# Patient Record
Sex: MALE | Race: WHITE | NOT HISPANIC OR LATINO | Employment: OTHER | URBAN - METROPOLITAN AREA
[De-identification: names, ages, dates, MRNs, and addresses within clinical notes are randomized per-mention and may not be internally consistent; named-entity substitution may affect disease eponyms.]

---

## 2017-02-12 ENCOUNTER — PATIENT MESSAGE (OUTPATIENT)
Dept: MEDICAL GROUP | Facility: PHYSICIAN GROUP | Age: 62
End: 2017-02-12

## 2017-02-13 DIAGNOSIS — E78.5 DYSLIPIDEMIA: ICD-10-CM

## 2017-03-13 ENCOUNTER — OFFICE VISIT (OUTPATIENT)
Dept: MEDICAL GROUP | Facility: PHYSICIAN GROUP | Age: 62
End: 2017-03-13
Payer: COMMERCIAL

## 2017-03-13 VITALS
TEMPERATURE: 97.8 F | WEIGHT: 197 LBS | SYSTOLIC BLOOD PRESSURE: 130 MMHG | DIASTOLIC BLOOD PRESSURE: 84 MMHG | BODY MASS INDEX: 30.92 KG/M2 | OXYGEN SATURATION: 96 % | HEIGHT: 67 IN | HEART RATE: 66 BPM | RESPIRATION RATE: 16 BRPM

## 2017-03-13 DIAGNOSIS — E78.5 DYSLIPIDEMIA: ICD-10-CM

## 2017-03-13 DIAGNOSIS — Z12.5 PROSTATE CANCER SCREENING: ICD-10-CM

## 2017-03-13 DIAGNOSIS — E66.9 OBESITY (BMI 30-39.9): ICD-10-CM

## 2017-03-13 DIAGNOSIS — E55.9 VITAMIN D DEFICIENCY: ICD-10-CM

## 2017-03-13 DIAGNOSIS — Z13.1 SCREENING FOR DIABETES MELLITUS (DM): ICD-10-CM

## 2017-03-13 PROCEDURE — 99214 OFFICE O/P EST MOD 30 MIN: CPT | Performed by: INTERNAL MEDICINE

## 2017-03-13 RX ORDER — PRAVASTATIN SODIUM 40 MG
40 TABLET ORAL DAILY
Qty: 90 TAB | Refills: 3 | Status: SHIPPED | OUTPATIENT
Start: 2017-03-13 | End: 2018-04-03 | Stop reason: SDUPTHER

## 2017-03-13 ASSESSMENT — PATIENT HEALTH QUESTIONNAIRE - PHQ9: CLINICAL INTERPRETATION OF PHQ2 SCORE: 0

## 2017-03-13 NOTE — ASSESSMENT & PLAN NOTE
Pt is on atorvastatin 20 mg daily. Patient is taking medication as prescribed    Patient does have myalgia since starting this medication in November.

## 2017-03-13 NOTE — PROGRESS NOTES
"Subjective:   Jd Bermudez is a 61 y.o. male here today for DLD, vitamin D deficiency    Dyslipidemia  Pt is on atorvastatin 20 mg daily. Patient is taking medication as prescribed    Patient does have myalgia since starting this medication in November.     Vitamin D deficiency  Pt is on vitamin D supplementation of 5000 IU per day. Most recent labs show normal Vit D       Current medicines (including changes today)  Current Outpatient Prescriptions   Medication Sig Dispense Refill   • pravastatin (PRAVACHOL) 40 MG tablet Take 1 Tab by mouth every day. 90 Tab 3   • vitamin D (CHOLECALCIFEROL) 1000 UNIT TABS Take 1,000 Units by mouth every day.       No current facility-administered medications for this visit.     He  has a past medical history of Chronic back pain; Vitamin D deficiency; and Hyperlipidemia.    ROS   + chest pain - atypical and has been present for many years (15 + years). Pt reports previous stress test that he was told was normal  Pertinent  ROS findings as above. All other systems reviewed and are negative.      Objective:     Blood pressure 130/84, pulse 66, temperature 36.6 °C (97.8 °F), resp. rate 16, height 1.702 m (5' 7\"), weight 89.359 kg (197 lb), SpO2 96 %. Body mass index is 30.85 kg/(m^2).   Physical Exam:  Constitutional: Alert & oriented, no acute distress  Eye: Conjunctiva clear, lids normal, no discharge  ENMT: Lips without lesions, normal external nose and ears  Neck: Trachea midline, no masses, no thyromegaly. No cervical or supraclavicular lymphadenopathy  Respiratory: Unlabored respiratory effort, lungs clear to auscultation, no wheezes, no ronchi  Cardiovascular: Normal S1, S2, no murmur, no edema  Abdomen: obese  Skin: Warm, dry, good turgor, no rashes in visible areas  Neuro: No overt focal neurologic deficits, normal gait  Psych: Normal mood and affect      Assessment and Plan:   The following treatment plan was discussed    1. Dyslipidemia  Given myalgia will change to " pravastatin. Recheck labs in 3 months to assess response and assess liver enzymes  - pravastatin (PRAVACHOL) 40 MG tablet; Take 1 Tab by mouth every day.  Dispense: 90 Tab; Refill: 3  - CBC WITH DIFFERENTIAL; Future  - LIPID PROFILE; Future    2. Vitamin D deficiency  Well controlled. Continue supplementation of 5000 IU per day  - VITAMIN D,25 HYDROXY; Future    3. Screening for diabetes mellitus (DM)  - COMP METABOLIC PANEL; Future    4. Prostate cancer screening  - PROSTATE SPECIFIC AG SCREENING; Future    5. Obesity (BMI 30-39.9)  - Patient identified as having weight management issue.  Appropriate orders and counseling given.    6. Chest Pain  - atypical and unchanged and has been present for many years (15 + years). Pt reports previous stress test that he was told was normal. Monitor clinically with low threshold for repeat stress test if new symptoms arise    Followup: Return in about 6 months (around 9/13/2017).    Please note that this dictation was created using voice recognition software. I have made every reasonable attempt to correct obvious errors, but I expect that there are errors of grammar and possibly content that I did not discover before finalizing the note.

## 2017-07-26 ENCOUNTER — TELEPHONE (OUTPATIENT)
Dept: MEDICAL GROUP | Facility: PHYSICIAN GROUP | Age: 62
End: 2017-07-26

## 2017-07-26 NOTE — TELEPHONE ENCOUNTER
----- Message from Oliver Lange M.D. sent at 7/25/2017 10:15 AM PDT -----  Labs reviewed and I have no concerns. Vitamin D is in the normal range, there are no elevation of liver enzymes, cholesterol levels appear to be controlled on pravastatin. Please call patient and inform them. Thank you  - Dr. Lange

## 2017-09-21 ENCOUNTER — TELEPHONE (OUTPATIENT)
Dept: HEALTH INFORMATION MANAGEMENT | Facility: OTHER | Age: 62
End: 2017-09-21

## 2017-09-21 DIAGNOSIS — Z12.11 SCREENING FOR COLON CANCER: ICD-10-CM

## 2017-09-26 ENCOUNTER — TELEPHONE (OUTPATIENT)
Dept: MEDICAL GROUP | Facility: PHYSICIAN GROUP | Age: 62
End: 2017-09-26

## 2017-09-26 DIAGNOSIS — Z12.11 COLON CANCER SCREENING: ICD-10-CM

## 2017-09-26 NOTE — TELEPHONE ENCOUNTER
----- Message from Jd Bermudez sent at 9/26/2017  4:00 PM PDT -----  Regarding: Non-Urgent Medical Question  Contact: [phone]  Hi, thanks for referral #[id number]. However I am going to [facility] ,they did my last one  in 2007.Dr. Pineda.Is this a problem?

## 2017-09-26 NOTE — TELEPHONE ENCOUNTER
1. Caller Name: Jd Bermudez                                         Call Back Number: [phone] (home)       Patient approves a detailed voicemail message: N\A    2. SPECIFIC Action To Be Taken: referral needed for Colonoscopy Previous referral already closed due to expiring please double check expiration date that it is one year thank you    3. Diagnosis/Clinical Reason for Request: Z12.11 (ICD-10-CM) - Screening for colon cancer*    4. Specialty & Provider Name/Lab/Imaging Location: [facility]    5. Is appointment scheduled for requested order/referral: no    Patient informed they will receive a return phone call from the office ONLY if there are any questions before processing their request. Advised to call back if they haven't received a call from the referral department in 5 days.

## 2017-10-02 ENCOUNTER — APPOINTMENT (OUTPATIENT)
Dept: MEDICAL GROUP | Facility: PHYSICIAN GROUP | Age: 62
End: 2017-10-02
Payer: COMMERCIAL

## 2017-10-10 ENCOUNTER — NON-PROVIDER VISIT (OUTPATIENT)
Dept: MEDICAL GROUP | Facility: PHYSICIAN GROUP | Age: 62
End: 2017-10-10
Payer: COMMERCIAL

## 2017-10-10 DIAGNOSIS — Z23 NEED FOR VACCINATION: ICD-10-CM

## 2017-10-10 PROCEDURE — 90471 IMMUNIZATION ADMIN: CPT | Performed by: INTERNAL MEDICINE

## 2017-10-10 PROCEDURE — 90686 IIV4 VACC NO PRSV 0.5 ML IM: CPT | Performed by: INTERNAL MEDICINE

## 2017-10-10 NOTE — PROGRESS NOTES
"Jd Bermudez is a 62 y.o. male here for a non-provider visit for:   FLU    Reason for immunization: Annual Flu Vaccine  Immunization records indicate need for vaccine: Yes, confirmed with Epic  Minimum interval has been met for this vaccine: Yes  ABN completed: Not Indicated    Order and dose verified by: MICHAEL  VIS Dated  8/7/15 was given to patient: Yes  All IAC Questionnaire questions were answered \"No.\"    Patient tolerated injection and no adverse effects were observed or reported: Yes    Pt scheduled for next dose in series: Not Indicated  "

## 2017-11-08 ENCOUNTER — OFFICE VISIT (OUTPATIENT)
Dept: MEDICAL GROUP | Facility: PHYSICIAN GROUP | Age: 62
End: 2017-11-08
Payer: COMMERCIAL

## 2018-04-03 DIAGNOSIS — E78.5 DYSLIPIDEMIA: ICD-10-CM

## 2018-04-03 RX ORDER — PRAVASTATIN SODIUM 40 MG
40 TABLET ORAL DAILY
Qty: 90 TAB | Refills: 0 | Status: SHIPPED | OUTPATIENT
Start: 2018-04-03 | End: 2018-05-07 | Stop reason: SDUPTHER

## 2018-04-03 NOTE — TELEPHONE ENCOUNTER
Requested Prescriptions     Signed Prescriptions Disp Refills   • pravastatin (PRAVACHOL) 40 MG tablet 90 Tab 0     Sig: Take 1 Tab by mouth every day. Patient needs to establish with a new PCP for future refills 04/03/18     Authorizing Provider: JONATHAN FREY A.P.R.N.

## 2018-05-07 DIAGNOSIS — E78.5 DYSLIPIDEMIA: ICD-10-CM

## 2018-05-07 RX ORDER — PRAVASTATIN SODIUM 40 MG
40 TABLET ORAL DAILY
Qty: 90 TAB | Refills: 1 | Status: SHIPPED | OUTPATIENT
Start: 2018-05-07 | End: 2018-08-09 | Stop reason: SDUPTHER

## 2018-05-07 NOTE — TELEPHONE ENCOUNTER
----- Message from Mora Brady sent at 5/7/2018 10:04 AM PDT -----  Regarding: FW: Provsandeep  Contact: [phone]      ----- Message -----  From: Jd Bermudez  Sent: 5/7/2018   7:10 AM  To: Nilson Hill  Subject: Provastatin                                      Topic: Technical Quality    Could I please get refilled?

## 2018-05-08 NOTE — TELEPHONE ENCOUNTER
Requested Prescriptions     Signed Prescriptions Disp Refills   • pravastatin (PRAVACHOL) 40 MG tablet 90 Tab 1     Sig: Take 1 Tab by mouth every day. Patient needs to establish with a new PCP for future refills 04/03/18     Authorizing Provider: JONATHAN FREY A.P.R.N.

## 2018-08-09 ENCOUNTER — OFFICE VISIT (OUTPATIENT)
Dept: MEDICAL GROUP | Facility: PHYSICIAN GROUP | Age: 63
End: 2018-08-09
Payer: COMMERCIAL

## 2018-08-09 VITALS
RESPIRATION RATE: 14 BRPM | HEART RATE: 69 BPM | SYSTOLIC BLOOD PRESSURE: 128 MMHG | BODY MASS INDEX: 30.31 KG/M2 | DIASTOLIC BLOOD PRESSURE: 78 MMHG | TEMPERATURE: 98.2 F | HEIGHT: 68 IN | OXYGEN SATURATION: 95 % | WEIGHT: 200 LBS

## 2018-08-09 DIAGNOSIS — E55.9 VITAMIN D DEFICIENCY: ICD-10-CM

## 2018-08-09 DIAGNOSIS — E78.5 DYSLIPIDEMIA: ICD-10-CM

## 2018-08-09 DIAGNOSIS — E66.9 OBESITY (BMI 30-39.9): ICD-10-CM

## 2018-08-09 DIAGNOSIS — E66.09 CLASS 1 OBESITY DUE TO EXCESS CALORIES WITHOUT SERIOUS COMORBIDITY WITH BODY MASS INDEX (BMI) OF 30.0 TO 30.9 IN ADULT: ICD-10-CM

## 2018-08-09 DIAGNOSIS — K63.5 BENIGN COLON POLYP: ICD-10-CM

## 2018-08-09 PROBLEM — K51.419 PSEUDOPOLYP OF SIGMOID COLON WITH COMPLICATION (HCC): Status: ACTIVE | Noted: 2018-08-09

## 2018-08-09 PROCEDURE — 99214 OFFICE O/P EST MOD 30 MIN: CPT | Performed by: NURSE PRACTITIONER

## 2018-08-09 RX ORDER — PRAVASTATIN SODIUM 40 MG
TABLET ORAL
Qty: 30 TAB | Refills: 1 | Status: SHIPPED | OUTPATIENT
Start: 2018-08-09 | End: 2018-10-10 | Stop reason: SDUPTHER

## 2018-08-09 RX ORDER — PRAVASTATIN SODIUM 40 MG
TABLET ORAL
Qty: 30 TAB | Refills: 6 | Status: CANCELLED | OUTPATIENT
Start: 2018-08-09

## 2018-08-09 ASSESSMENT — PATIENT HEALTH QUESTIONNAIRE - PHQ9: CLINICAL INTERPRETATION OF PHQ2 SCORE: 0

## 2018-08-09 NOTE — ASSESSMENT & PLAN NOTE
Patient reports that he is not currently taking Vitamin D.  He states he is outside a lot, but is interested in an updated level.  Labs ordered.

## 2018-08-09 NOTE — PROGRESS NOTES
"Chief Complaint   Patient presents with   • Establish Care   • Medication Refill   • Orders Needed     labs    • Hyperlipidemia       Subjective:   Jd Bermudez is a 63 y.o. male here today to establish care and for evaluation and management of:    Benign colon polyp  Had polyps and is on a 3-5 recall.     Vitamin D deficiency  Patient reports that he is not currently taking Vitamin D.  He states he is outside a lot, but is interested in an updated level.  Labs ordered.     Dyslipidemia  Taking pravastatin 40 mg daily.  Last cholesterol WNL.  Due for yearly check.  Refill provided.  Labs ordered. Denies chest pain, stroke like symptoms.  No abdominal pain reported or muscle pains.    Obesity (BMI 30-39.9)  BMI 30.41 today.  Walks and hikes regularly.  Eating more fish and veggies.  Drinking 2-3 beers daily.  Discussed weight loss.          Current medicines (including changes today)  Current Outpatient Prescriptions   Medication Sig Dispense Refill   • pravastatin (PRAVACHOL) 40 MG tablet Take one tablet daily, preferably in the evening 30 Tab 1     No current facility-administered medications for this visit.      He  has a past medical history of Chronic back pain; Hyperlipidemia; and Vitamin D deficiency.    ROS as stated in hpi  No chest pain, no shortness of breath, no abdominal pain       Objective:     Blood pressure 128/78, pulse 69, temperature 36.8 °C (98.2 °F), resp. rate 14, height 1.727 m (5' 8\"), weight 90.7 kg (200 lb), SpO2 95 %. Body mass index is 30.41 kg/m². BMI 30.41  Physical Exam:  Constitutional: Alert, no distress.  Skin: Warm, dry, good turgor,no cyanosis, no rashes in visible areas.  Eye: Equal, round and reactive, conjunctiva clear, lids normal.  Ears: No tenderness, no discharge.  External canals are without any significant edema or erythema.  Tympanic membranes are without any inflammation, no effusion.  Gross auditory acuity is intact.  Nose: symmetrical without tenderness, no " discharge.  Mouth/Throat: lips without lesion.  Oropharynx clear.  Throat without erythema, exudates or tonsillar enlargement.  Neck: Trachea midline, no masses, no obvious thyroid enlargement.. No cervical or supraclavicular lymphadenopathy. Range of motion within normal limits.  Neuro: Cranial nerves 2-12 grossly intact.  No sensory deficit.  Respiratory: Unlabored respiratory effort, lungs clear to auscultation, no wheezes, no ronchi.  Cardiovascular: Normal S1, S2, no murmur, no edema.  Abdomen: Soft, non-tender, no masses, no guarding,  no hepatosplenomegaly.  Psych: Alert and oriented x3, normal affect and mood and judgement.        Assessment and Plan:   The following treatment plan was discussed    1. Dyslipidemia  This is a new problem to me.  Chronic, ongoing.  Will recheck labs.  Monitor and follow. Discussed diet, exercise and weight loss.   - pravastatin (PRAVACHOL) 40 MG tablet; Take one tablet daily, preferably in the evening  Dispense: 30 Tab; Refill: 1  - CBC WITH DIFFERENTIAL; Future  - COMP METABOLIC PANEL; Future  - LIPID PROFILE; Future  - VITAMIN D,25 HYDROXY; Future    2. Benign colon polyp  This is a new problem to me.  Chronic,  Followed by Dr. ABARCA at [facility].  Recall colonoscopy due in 3 years. Monitor. May consider adding an 81 mg asa daily.     3. Class 1 obesity due to excess calories without serious comorbidity with body mass index (BMI) of 30.0 to 30.9 in adult  This is a new problem to me.  Chronic, ongoing.  Continue good exercise and diet.  May consider decreasing alcohol intake to improve weight.   - Patient identified as having weight management issue.  Appropriate orders and counseling given.    4. Vitamin D deficiency  This is a new problem to me.  Chronic, ongoing.  Will recheck labs.  Monitor and follow results.      5. Obesity (BMI 30-39.9)  See #3      Followup: Return in about 1 year (around 8/9/2019) for Annual.

## 2018-08-09 NOTE — ASSESSMENT & PLAN NOTE
Taking pravastatin 40 mg daily.  Last cholesterol WNL.  Due for yearly check.  Refill provided.  Labs ordered. Denies chest pain, stroke like symptoms.  No abdominal pain reported or muscle pains.

## 2018-08-09 NOTE — ASSESSMENT & PLAN NOTE
BMI 30.41 today.  Walks and hikes regularly.  Eating more fish and veggies.  Drinking 2-3 beers daily.  Discussed weight loss.

## 2018-08-11 ENCOUNTER — PATIENT MESSAGE (OUTPATIENT)
Dept: MEDICAL GROUP | Facility: PHYSICIAN GROUP | Age: 63
End: 2018-08-11

## 2018-08-13 ENCOUNTER — PATIENT MESSAGE (OUTPATIENT)
Dept: MEDICAL GROUP | Facility: PHYSICIAN GROUP | Age: 63
End: 2018-08-13

## 2018-08-13 NOTE — TELEPHONE ENCOUNTER
From: Jd Bermudez  To: ADEEL Lazaro  Sent: 8/13/2018 3:28 PM PDT  Subject: Non-Urgent Medical Question    I don,t have another appointment until 8/15/2019??  ----- Message -----  From: ADEEL Lazaro  Sent: 8/13/2018 2:26 PM PDT  To: Jd Bermudez  Subject: RE: Non-Urgent Medical Question  The CDC is recommending the new Shingrix vaccine(s) to all persons, even those who have had the Zostavax. We can certainly review together at your appointment on Wednesday  ADEEL Lazaro      ----- Message -----   From: Jd Bermudez   Sent: 8/11/2018 9:51 AM PDT   To: ADEEL Lazaro  Subject: Non-Urgent Medical Question    I had a shingles shot last year ,is this Zoster shot needed also?

## 2018-08-15 ENCOUNTER — TELEPHONE (OUTPATIENT)
Dept: MEDICAL GROUP | Facility: PHYSICIAN GROUP | Age: 63
End: 2018-08-15

## 2018-08-15 NOTE — TELEPHONE ENCOUNTER
----- Message from ADEEL Lazaro sent at 8/14/2018  5:42 PM PDT -----  Jd  Just received your labs back.  All results are in the normal/acceptable range!.  Good job  ADEEL Lazaro

## 2018-09-26 ENCOUNTER — NON-PROVIDER VISIT (OUTPATIENT)
Dept: MEDICAL GROUP | Facility: PHYSICIAN GROUP | Age: 63
End: 2018-09-26
Payer: COMMERCIAL

## 2018-09-26 DIAGNOSIS — Z23 NEED FOR VACCINATION: ICD-10-CM

## 2018-09-26 PROCEDURE — 90686 IIV4 VACC NO PRSV 0.5 ML IM: CPT | Performed by: NURSE PRACTITIONER

## 2018-09-26 PROCEDURE — 90471 IMMUNIZATION ADMIN: CPT | Performed by: NURSE PRACTITIONER

## 2018-09-26 NOTE — PROGRESS NOTES
"Jd Bermudez is a 63 y.o. male here for a non-provider visit for:   FLU    Reason for immunization: Annual Flu Vaccine  Immunization records indicate need for vaccine: Yes, confirmed with Epic  Minimum interval has been met for this vaccine: Yes  ABN completed: Not Indicated    Order and dose verified by: NICHOLAS  VIS Dated  8/7/2015 was given to patient: Yes  All IAC Questionnaire questions were answered \"No.\"    Patient tolerated injection and no adverse effects were observed or reported: Yes    Pt scheduled for next dose in series: Not Indicated  "

## 2018-10-10 DIAGNOSIS — E78.5 DYSLIPIDEMIA: ICD-10-CM

## 2018-10-11 RX ORDER — PRAVASTATIN SODIUM 40 MG
TABLET ORAL
Qty: 30 TAB | Refills: 3 | Status: SHIPPED | OUTPATIENT
Start: 2018-10-11 | End: 2019-05-30 | Stop reason: SDUPTHER

## 2018-10-11 NOTE — TELEPHONE ENCOUNTER
Requested Prescriptions     Signed Prescriptions Disp Refills   • pravastatin (PRAVACHOL) 40 MG tablet 30 Tab 3     Sig: Take one tablet daily, preferably in the evening     Authorizing Provider: JONATHAN FREY A.P.R.N.

## 2019-05-30 DIAGNOSIS — E78.5 DYSLIPIDEMIA: ICD-10-CM

## 2019-05-30 RX ORDER — PRAVASTATIN SODIUM 40 MG
TABLET ORAL
Qty: 90 TAB | Refills: 10 | Status: SHIPPED | OUTPATIENT
Start: 2019-05-30 | End: 2020-06-12

## 2019-05-30 NOTE — TELEPHONE ENCOUNTER
Requested Prescriptions     Signed Prescriptions Disp Refills   • pravastatin (PRAVACHOL) 40 MG tablet 90 Tab 10     Sig: Take one tablet daily, preferably in the evening     Authorizing Provider: JONATHAN FREY A.P.R.N.

## 2019-05-30 NOTE — TELEPHONE ENCOUNTER
Was the patient seen in the last year in this department? Yes LOV 08/09/18    Does patient have an active prescription for medications requested? No     Received Request Via: Pharmacy

## 2019-08-20 ENCOUNTER — OFFICE VISIT (OUTPATIENT)
Dept: MEDICAL GROUP | Facility: PHYSICIAN GROUP | Age: 64
End: 2019-08-20
Payer: COMMERCIAL

## 2019-08-20 VITALS
WEIGHT: 205 LBS | TEMPERATURE: 97.1 F | OXYGEN SATURATION: 95 % | SYSTOLIC BLOOD PRESSURE: 132 MMHG | HEIGHT: 68 IN | HEART RATE: 66 BPM | RESPIRATION RATE: 14 BRPM | DIASTOLIC BLOOD PRESSURE: 78 MMHG | BODY MASS INDEX: 31.07 KG/M2

## 2019-08-20 DIAGNOSIS — Z00.00 ADULT GENERAL MEDICAL EXAM: ICD-10-CM

## 2019-08-20 DIAGNOSIS — I83.891 VARICOSE VEINS OF LEG WITH SWELLING, RIGHT: ICD-10-CM

## 2019-08-20 DIAGNOSIS — Z91.89 OTHER SPECIFIED PERSONAL RISK FACTORS, NOT ELSEWHERE CLASSIFIED: ICD-10-CM

## 2019-08-20 DIAGNOSIS — Z00.00 PREVENTATIVE HEALTH CARE: ICD-10-CM

## 2019-08-20 DIAGNOSIS — E78.5 DYSLIPIDEMIA: ICD-10-CM

## 2019-08-20 DIAGNOSIS — E66.9 OBESITY (BMI 30-39.9): ICD-10-CM

## 2019-08-20 PROCEDURE — 99386 PREV VISIT NEW AGE 40-64: CPT | Performed by: NURSE PRACTITIONER

## 2019-08-20 ASSESSMENT — PATIENT HEALTH QUESTIONNAIRE - PHQ9: CLINICAL INTERPRETATION OF PHQ2 SCORE: 0

## 2019-08-20 NOTE — PROGRESS NOTES
"Chief Complaint   Patient presents with   • Annual Exam       Subjective:   Jd Bermudez is a 64 y.o. male here today for evaluation and management of:    Adult general medical exam  Here for annual exam.      Varicose veins of leg with swelling, right  Right leg is swollen compared to left, but has numerous large varicose veins that are noticeable larger. This has been going on for years.  No changes, no redness or tenderness reported.     Obesity (BMI 30-39.9)  BMI 31.17 today.  Walking 2 laps daily around [place]      Dyslipidemia  Taking pravastatin nightly.  Is interested in getting off, if not needed.  Discussed labs and also cardiac CT scoring.  He is interested in having more information about his coronary arteries.           Current medicines (including changes today)  Current Outpatient Medications   Medication Sig Dispense Refill   • pravastatin (PRAVACHOL) 40 MG tablet Take one tablet daily, preferably in the evening 90 Tab 10     No current facility-administered medications for this visit.      He  has a past medical history of Chronic back pain, Hyperlipidemia, and Vitamin D deficiency.    ROS as stated in hpi  No chest pain, no shortness of breath, no abdominal pain       Objective:     /78   Pulse 66   Temp 36.2 °C (97.1 °F) (Temporal)   Resp 14   Ht 1.727 m (5' 8\")   Wt 93 kg (205 lb)   SpO2 95%  Body mass index is 31.17 kg/m².   Physical Exam:  Constitutional: Alert, no distress.  Skin: Warm, dry, good turgor,no cyanosis, no rashes in visible areas.  Eye: Equal, round and reactive, conjunctiva clear, lids normal.  Ears: No tenderness, no discharge.  External canals are without any significant edema or erythema.  Tympanic membranes are without any inflammation, no effusion.  Gross auditory acuity is intact.  Nose: symmetrical without tenderness, no discharge.  Mouth/Throat: lips without lesion.  Oropharynx clear.  Throat without erythema, exudates or tonsillar enlargement.  Neck: " Trachea midline, no masses, no obvious thyroid enlargement.. No cervical or supraclavicular lymphadenopathy. Range of motion within normal limits.  Neuro: Cranial nerves 2-12 grossly intact.  No sensory deficit.  Respiratory: Unlabored respiratory effort, lungs clear to auscultation, no wheezes, no ronchi.  Cardiovascular: Normal S1, S2, no murmur, no edema.  Abdomen: Soft, non-tender, no masses, no guarding,  no hepatosplenomegaly.  Psych: Alert and oriented x3, normal affect and mood and judgement.        Assessment and Plan:   The following treatment plan was discussed    1. Preventative health care  Here today for annual exam.  Due for labs.  Discussed diet, exercise, weight management.  Labs ordered.  Monitor and follow results.   - CBC WITH DIFFERENTIAL; Future  - Comp Metabolic Panel; Future  - TSH; Future  - PROSTATE SPECIFIC AG DIAGNOSTIC; Future    2. Dyslipidemia  Chronic, ongoing. Continue with pravastatin.  Will order CT cardiac scoring as patient would like to discuss coming off statin, if possible.  Monitor and folow.   - Lipid Profile; Future    3. Other specified personal risk factors, not elsewhere classified  See #2  - CT-CARDIAC SCORING; Future    4. Adult general medical exam  See #1    5. Varicose veins of leg with swelling, right  Chronic, ongoing. Stable.  Recommended compression stockings when traveling.  Red flags reviewed.  Monitor.     6. Obesity (BMI 30-39.9)  Chronnic, ongoing, continue with walking daily.  Monitor and follow.   - Patient identified as having weight management issue.  Appropriate orders and counseling given.      Followup: Return in about 1 year (around 8/20/2020) for Annual.         Educated in proper administration of medication(s) ordered today including safety, possible SE, risks, benefits, rationale and alternatives to therapy.     Please note that this dictation was created using voice recognition software. I have made every reasonable attempt to correct obvious  errors, but I expect that there are errors of grammar and possibly content that I did not discover before finalizing the note.

## 2019-08-20 NOTE — ASSESSMENT & PLAN NOTE
Right leg is swollen compared to left, but has numerous large varicose veins that are noticeable larger. This has been going on for years.  No changes, no redness or tenderness reported.

## 2019-08-20 NOTE — ASSESSMENT & PLAN NOTE
Taking pravastatin nightly.  Is interested in getting off, if not needed.  Discussed labs and also cardiac CT scoring.  He is interested in having more information about his coronary arteries.

## 2019-09-06 ENCOUNTER — HOSPITAL ENCOUNTER (OUTPATIENT)
Dept: RADIOLOGY | Facility: MEDICAL CENTER | Age: 64
End: 2019-09-06
Attending: NURSE PRACTITIONER
Payer: COMMERCIAL

## 2019-09-06 DIAGNOSIS — Z91.89 OTHER SPECIFIED PERSONAL RISK FACTORS, NOT ELSEWHERE CLASSIFIED: ICD-10-CM

## 2019-09-06 PROCEDURE — 4410556 CT-CARDIAC SCORING

## 2019-10-04 ENCOUNTER — NON-PROVIDER VISIT (OUTPATIENT)
Dept: MEDICAL GROUP | Facility: PHYSICIAN GROUP | Age: 64
End: 2019-10-04
Payer: COMMERCIAL

## 2019-10-04 DIAGNOSIS — Z23 NEED FOR VACCINATION: ICD-10-CM

## 2019-10-04 PROCEDURE — 90471 IMMUNIZATION ADMIN: CPT | Performed by: FAMILY MEDICINE

## 2019-10-04 PROCEDURE — 90686 IIV4 VACC NO PRSV 0.5 ML IM: CPT | Performed by: FAMILY MEDICINE

## 2019-10-04 NOTE — PROGRESS NOTES
"ban Bermudez is a 64 y.o. male here for a non-provider visit for:   FLU    Reason for immunization: Annual Flu Vaccine  Immunization records indicate need for vaccine: Yes, confirmed with Epic  Minimum interval has been met for this vaccine: Yes  ABN completed: Yes    Order and dose verified by: mo  VIS Dated  8/15/19 was given to patient: Yes  All IAC Questionnaire questions were answered \"No.\"    Patient tolerated injection and no adverse effects were observed or reported: Yes    Pt scheduled for next dose in series: Not Indicated  "

## 2019-11-12 ENCOUNTER — PATIENT MESSAGE (OUTPATIENT)
Dept: MEDICAL GROUP | Facility: PHYSICIAN GROUP | Age: 64
End: 2019-11-12

## 2020-06-12 DIAGNOSIS — E78.5 DYSLIPIDEMIA: ICD-10-CM

## 2020-06-12 NOTE — TELEPHONE ENCOUNTER
Received request via: Pharmacy    Was the patient seen in the last year in this department? Yes LOV 08/20/19 LABS 08/20/19 No upcoming Qu[place].    Does the patient have an active prescription (recently filled or refills available) for medication(s) requested? No

## 2020-06-13 RX ORDER — PRAVASTATIN SODIUM 40 MG
TABLET ORAL
Qty: 90 TAB | Refills: 0 | Status: SHIPPED | OUTPATIENT
Start: 2020-06-13 | End: 2020-07-07 | Stop reason: SDUPTHER

## 2020-06-14 NOTE — TELEPHONE ENCOUNTER
Requested Prescriptions     Signed Prescriptions Disp Refills   • pravastatin (PRAVACHOL) 40 MG tablet 90 Tab 0     Sig: TAKE ONE TABLET BY MOUTH DAILY IN THE EVENING     Authorizing Provider: JONATHAN FREY A.P.R.N.

## 2020-07-07 ENCOUNTER — OFFICE VISIT (OUTPATIENT)
Dept: MEDICAL GROUP | Facility: PHYSICIAN GROUP | Age: 65
End: 2020-07-07
Payer: COMMERCIAL

## 2020-07-07 VITALS
BODY MASS INDEX: 28.95 KG/M2 | DIASTOLIC BLOOD PRESSURE: 78 MMHG | HEART RATE: 68 BPM | TEMPERATURE: 97.5 F | WEIGHT: 191 LBS | RESPIRATION RATE: 16 BRPM | OXYGEN SATURATION: 99 % | HEIGHT: 68 IN | SYSTOLIC BLOOD PRESSURE: 118 MMHG

## 2020-07-07 DIAGNOSIS — Z00.00 ADULT GENERAL MEDICAL EXAM: ICD-10-CM

## 2020-07-07 DIAGNOSIS — E66.9 OBESITY (BMI 30-39.9): ICD-10-CM

## 2020-07-07 DIAGNOSIS — E55.9 VITAMIN D DEFICIENCY: ICD-10-CM

## 2020-07-07 DIAGNOSIS — E78.5 DYSLIPIDEMIA: ICD-10-CM

## 2020-07-07 DIAGNOSIS — I83.891 VARICOSE VEINS OF LEG WITH SWELLING, RIGHT: ICD-10-CM

## 2020-07-07 DIAGNOSIS — R13.12 OROPHARYNGEAL DYSPHAGIA: ICD-10-CM

## 2020-07-07 PROCEDURE — 99386 PREV VISIT NEW AGE 40-64: CPT | Performed by: NURSE PRACTITIONER

## 2020-07-07 RX ORDER — PRAVASTATIN SODIUM 40 MG
TABLET ORAL
Qty: 90 TAB | Refills: 3 | Status: SHIPPED | OUTPATIENT
Start: 2020-07-07 | End: 2021-05-25 | Stop reason: SDUPTHER

## 2020-07-07 ASSESSMENT — PATIENT HEALTH QUESTIONNAIRE - PHQ9: CLINICAL INTERPRETATION OF PHQ2 SCORE: 0

## 2020-07-07 NOTE — PROGRESS NOTES
Subjective:     CC:   Chief Complaint   Patient presents with   • Annual Exam       HPI:   Jd Bermudez is a 65 y.o. male who presents for annual exam    Last Colorectal Cancer Screening: due 2028  Last Tdap: 2026  Received HPV series: Aged out  Hx STDs: No    Exercise: strenuous regular exercise, aerobic > 3 hours a week  Diet: weight down 15 pounds      He  has a past medical history of Chronic back pain, Hyperlipidemia, and Vitamin D deficiency.  He  has a past surgical history that includes rotator cuff repair (2012); other; and laminotomy (2012).    Family History   Problem Relation Age of Onset   • Stroke Father 60     Social History     Tobacco Use   • Smoking status: Never Smoker   • Smokeless tobacco: Never Used   Substance Use Topics   • Alcohol use: Yes     Alcohol/week: 7.2 oz     Types: 12 Cans of beer per week     Comment: week   • Drug use: No     He  reports being sexually active and has had partner(s) who are Female.    Patient Active Problem List    Diagnosis Date Noted   • Oropharyngeal dysphagia 07/07/2020   • Varicose veins of leg with swelling, right 08/20/2019   • Benign colon polyp 08/09/2018   • Adult general medical exam 11/08/2016   • Obesity (BMI 30-39.9) 11/08/2016   • Vitamin D deficiency 12/04/2012   • Dyslipidemia 12/04/2012     Current Outpatient Medications   Medication Sig Dispense Refill   • pravastatin (PRAVACHOL) 40 MG tablet TAKE ONE TABLET BY MOUTH DAILY IN THE EVENING 90 Tab 3     No current facility-administered medications for this visit.      No Known Allergies    Review of Systems   Constitutional: Negative for fever, chills and malaise/fatigue.   HENT: Negative for congestion.    Eyes: Negative for pain.   Respiratory: Negative for cough and shortness of breath.    Cardiovascular: Negative for chest pain and leg swelling.   Gastrointestinal: Negative for nausea, vomiting, abdominal pain and diarrhea.   Genitourinary: Negative for dysuria and hematuria.   Skin:  "Negative for rash.   Neurological: Negative for dizziness, focal weakness and headaches.   Endo/Heme/Allergies: Does not bruise/bleed easily.   Psychiatric/Behavioral: Negative for depression.  The patient is not nervous/anxious.      Objective:   /78 (BP Location: Left arm, Patient Position: Sitting)   Pulse 68   Temp 36.4 °C (97.5 °F) (Temporal)   Resp 16   Ht 1.727 m (5' 8\")   Wt 86.6 kg (191 lb)   SpO2 99%   BMI 29.04 kg/m²      Wt Readings from Last 4 Encounters:   07/07/20 86.6 kg (191 lb)   08/20/19 93 kg (205 lb)   08/09/18 90.7 kg (200 lb)   03/13/17 89.4 kg (197 lb)       A chaperone was offered to the patient during today's exam. Patient declined chaperone.    Physical Exam:  Constitutional: Well-developed and well-nourished. Not diaphoretic. No distress.   Skin: Skin is warm and dry. No rash noted.  Head: Atraumatic without lesions.  Eyes: Conjunctivae and extraocular motions are normal. Pupils are equal, round, and reactive to light. No scleral icterus.   Ears:  External ears unremarkable. Tympanic membranes clear and intact.  Nose: Nares patent. Septum midline. Turbinates without erythema nor edema. No discharge.   Mouth/Throat: Tongue normal. Oropharynx is clear and moist. Posterior pharynx without erythema or exudates.  Neck: Supple, trachea midline. Normal range of motion. No thyromegaly present. No lymphadenopathy--cervical or supraclavicular.  Cardiovascular: Regular rate and rhythm, S1 and S2 without murmur, rubs, or gallops.    Abdomen: Soft, non tender, and without distention. Active bowel sounds in all four quadrants. No rebound, guarding, masses or HSM.  Extremities: No cyanosis, clubbing, erythema, nor edema. Distal pulses intact and symmetric.   Musculoskeletal: All major joints AROM full in all directions without pain.  Neurological: Alert and oriented x 3. Grossly non-focal. Strength and sensation grossly intact. DTRs 2+/3 and symmetric.   Psychiatric:  Behavior, mood, and " affect are appropriate.      Assessment and Plan:     1. Obesity (BMI 30-39.9)chronic, ongoing, improved bmi, weight down 15 pounds.  Discussed decreasing daily alcohol intake.     2. Dyslipidemia  Chronic, ongoing. Controlled with pravastatin.  Due for labs.  Refill on pravastatin.  Continue walking and weight loss  - Comp Metabolic Panel; Future  - Lipid Profile; Future  - pravastatin (PRAVACHOL) 40 MG tablet; TAKE ONE TABLET BY MOUTH DAILY IN THE EVENING  Dispense: 90 Tab; Refill: 3    3. Adult general medical exam    4. Vitamin D deficiency  Continue supplementation    5. Varicose veins of leg with swelling, right  Chronic, ongoing. Stable.      6. Oropharyngeal dysphagia  Chronic issue.  Stable.  Discussed red flags.  Will let me know if it increases in frequency.  Discussed GERD      Health maintenance:  Up to date   Labs per orders  Immunizations per orders  Patient counseled about skin care, diet, supplements, and exercise.  Discussed colorectal cancer screening     Follow-up: Return in about 1 year (around 7/7/2021) for Annual.

## 2020-07-07 NOTE — ASSESSMENT & PLAN NOTE
Here for annual exam.  Up to date on colonoscopy and immunizations. Taking pravastatin daily.  Due for lab work. All chronic medical conditions are stable.  Walking daily.  Weight down 15 pounds.

## 2020-07-22 ENCOUNTER — PATIENT MESSAGE (OUTPATIENT)
Dept: MEDICAL GROUP | Facility: PHYSICIAN GROUP | Age: 65
End: 2020-07-22

## 2020-07-22 NOTE — TELEPHONE ENCOUNTER
From: Jd Bermudez  To: ADEEL Lazaro  Sent: 7/22/2020 3:38 PM PDT  Subject: Test Result Question    So should I keep taking the cholesterol medication?      ----- Message -----   From:ADEEL Lazaro   Sent:7/22/2020 2:30 PM PDT   To:Jd Puentecitlaly   Subject:RE: Test Result Question    Rishabh  Labs show all normal values including kidney, liver, cholesterol and blood counts. Great job!  ADEEL Lazaro      ----- Message -----   From:Jd Thanh Bermudez   Sent:7/22/2020 11:14 AM PDT   To:ADEEL Lazaro   Subject:RE: Test Result Question    Thanks      ----- Message -----   From:ADEEL Lazaro   Sent:7/22/2020 10:18 AM PDT   To:Jd Thanh Bermudez   Subject:RE: Test Result Question    Rishabh  We will call [facility] and get your labs and then I can get back to you.  ADEEL Lazaro      ----- Message -----   From:Jd Thanh Bermudez   Sent:7/22/2020 9:09 AM PDT   To:ADEEL Lazaro   Subject:Test Result Question    [facility].      ----- Message -----   From:ADEEL Lazaro   Sent:7/22/2020 7:18 AM PDT   To:Jd Thanh Bermudez    Subject:RE: Test Result Question    Rishabh  The last labs I have are from last year. I am not seeing any this year. Where did you have the testing done?   ADEEL Lazaro      ----- Message -----   From:Jd Bermudez   Sent:7/22/2020 6:33 AM PDT   To:ADEEL Lazaro   Subject:Test Result Question    Did you have a chance to look at my blood test?

## 2020-07-22 NOTE — TELEPHONE ENCOUNTER
From: Jd Bermudez  To: ADEEL Lazaro  Sent: 7/22/2020 9:09 AM PDT  Subject: Test Result Question    [facility] [address]      ----- Message -----   From:ADEEL Lazaro   Sent:7/22/2020 7:18 AM PDT   To:Jd Bermudez   Subject:RE: Test Result Question    Rishabh  The last labs I have are from last year. I am not seeing any this year. Where did you have the testing done?   ADEEL Lazaro      ----- Message -----   From:Jd Bermudez   Sent:7/22/2020 6:33 AM PDT   To:ADEEL Lazaro   Subject:Test Result Question    Did you have a chance to look at my blood test?

## 2020-09-23 ENCOUNTER — NON-PROVIDER VISIT (OUTPATIENT)
Dept: MEDICAL GROUP | Facility: PHYSICIAN GROUP | Age: 65
End: 2020-09-23
Payer: COMMERCIAL

## 2020-09-23 DIAGNOSIS — Z23 NEED FOR VACCINATION: ICD-10-CM

## 2020-09-23 PROCEDURE — 90686 IIV4 VACC NO PRSV 0.5 ML IM: CPT | Performed by: NURSE PRACTITIONER

## 2020-09-23 PROCEDURE — 90471 IMMUNIZATION ADMIN: CPT | Performed by: NURSE PRACTITIONER

## 2020-09-23 NOTE — PROGRESS NOTES
"ban Bermudez is a 65 y.o. male here for a non-provider visit for:   FLU    Reason for immunization: Annual Flu Vaccine  Immunization records indicate need for vaccine: Yes, confirmed with Epic  Minimum interval has been met for this vaccine: Yes  ABN completed: Yes    Order and dose verified by: CAMRON  VIS Dated  08/15/2019 was given to patient: Yes  All IAC Questionnaire questions were answered \"No.\"    Patient tolerated injection and no adverse effects were observed or reported: Yes    Pt scheduled for next dose in series: Yes      "

## 2021-03-15 DIAGNOSIS — Z23 NEED FOR VACCINATION: ICD-10-CM

## 2021-03-17 ENCOUNTER — IMMUNIZATION (OUTPATIENT)
Dept: FAMILY PLANNING/WOMEN'S HEALTH CLINIC | Facility: IMMUNIZATION CENTER | Age: 66
End: 2021-03-17
Attending: INTERNAL MEDICINE
Payer: COMMERCIAL

## 2021-03-17 DIAGNOSIS — Z23 ENCOUNTER FOR VACCINATION: Primary | ICD-10-CM

## 2021-03-17 DIAGNOSIS — Z23 NEED FOR VACCINATION: ICD-10-CM

## 2021-03-17 PROCEDURE — 0001A PFIZER SARS-COV-2 VACCINE: CPT

## 2021-03-17 PROCEDURE — 91300 PFIZER SARS-COV-2 VACCINE: CPT

## 2021-04-08 ENCOUNTER — IMMUNIZATION (OUTPATIENT)
Dept: FAMILY PLANNING/WOMEN'S HEALTH CLINIC | Facility: IMMUNIZATION CENTER | Age: 66
End: 2021-04-08
Attending: INTERNAL MEDICINE
Payer: COMMERCIAL

## 2021-04-08 DIAGNOSIS — Z23 ENCOUNTER FOR VACCINATION: Primary | ICD-10-CM

## 2021-04-08 PROCEDURE — 0002A PFIZER SARS-COV-2 VACCINE: CPT

## 2021-04-08 PROCEDURE — 91300 PFIZER SARS-COV-2 VACCINE: CPT

## 2021-05-24 ENCOUNTER — PATIENT MESSAGE (OUTPATIENT)
Dept: MEDICAL GROUP | Facility: PHYSICIAN GROUP | Age: 66
End: 2021-05-24

## 2021-05-25 ENCOUNTER — OFFICE VISIT (OUTPATIENT)
Dept: MEDICAL GROUP | Facility: PHYSICIAN GROUP | Age: 66
End: 2021-05-25
Payer: COMMERCIAL

## 2021-05-25 VITALS
WEIGHT: 202 LBS | DIASTOLIC BLOOD PRESSURE: 78 MMHG | OXYGEN SATURATION: 94 % | RESPIRATION RATE: 16 BRPM | HEART RATE: 86 BPM | HEIGHT: 66 IN | TEMPERATURE: 98.1 F | SYSTOLIC BLOOD PRESSURE: 128 MMHG | BODY MASS INDEX: 32.47 KG/M2

## 2021-05-25 DIAGNOSIS — Z00.00 ADULT GENERAL MEDICAL EXAM: ICD-10-CM

## 2021-05-25 DIAGNOSIS — E66.9 OBESITY (BMI 30-39.9): ICD-10-CM

## 2021-05-25 DIAGNOSIS — E78.5 DYSLIPIDEMIA: ICD-10-CM

## 2021-05-25 DIAGNOSIS — Z13.21 ENCOUNTER FOR VITAMIN DEFICIENCY SCREENING: ICD-10-CM

## 2021-05-25 DIAGNOSIS — E55.9 VITAMIN D DEFICIENCY: ICD-10-CM

## 2021-05-25 DIAGNOSIS — I83.891 VARICOSE VEINS OF LEG WITH SWELLING, RIGHT: ICD-10-CM

## 2021-05-25 DIAGNOSIS — Z12.5 SCREENING FOR PROSTATE CANCER: ICD-10-CM

## 2021-05-25 PROCEDURE — 99396 PREV VISIT EST AGE 40-64: CPT | Performed by: NURSE PRACTITIONER

## 2021-05-25 RX ORDER — PRAVASTATIN SODIUM 40 MG
TABLET ORAL
Qty: 90 TABLET | Refills: 3 | Status: SHIPPED | OUTPATIENT
Start: 2021-05-25

## 2021-05-25 ASSESSMENT — PATIENT HEALTH QUESTIONNAIRE - PHQ9: CLINICAL INTERPRETATION OF PHQ2 SCORE: 0

## 2021-05-25 NOTE — ASSESSMENT & PLAN NOTE
Obvious varicose. Right greater than left. No pain, tenderness.  Wearing compression with travel and hiking.

## 2021-05-25 NOTE — PROGRESS NOTES
"Chief Complaint   Patient presents with   • Annual Exam       Subjective:   Jd Bermudez is a 65 y.o. male here today for eannual preventative exam.     Dyslipidemia  Labs WNL.  Refill pravastatin.  Total cholesterol at 163.  LDL 87    Adult general medical exam  Here for annual exam.  Up to date on all immunizations.  Preventative health up to date.      Obesity (BMI 30-39.9)  BMI 32.6.  Reports less exercise recently.  Wants to get back to hiking this summer.     Varicose veins of leg with swelling, right  Obvious varicose. Right greater than left. No pain, tenderness.  Wearing compression with travel and hiking.           Current medicines (including changes today)  Current Outpatient Medications   Medication Sig Dispense Refill   • pravastatin (PRAVACHOL) 40 MG tablet TAKE ONE TABLET BY MOUTH DAILY IN THE EVENING 90 tablet 3     No current facility-administered medications for this visit.     He  has a past medical history of Chronic back pain, Hyperlipidemia, and Vitamin D deficiency.    ROS as stated in hpi  No chest pain, no shortness of breath, no abdominal pain       Objective:     /78   Pulse 86   Temp 36.7 °C (98.1 °F) (Temporal)   Resp 16   Ht 1.676 m (5' 6\")   Wt 91.6 kg (202 lb)   SpO2 94%  Body mass index is 32.6 kg/m².   Physical Exam:  Constitutional: Alert, no distress.  Skin: Warm, dry, good turgor,no cyanosis, no rashes in visible areas.  Eye: Equal, round and reactive, conjunctiva clear, lids normal.    Neck: Trachea midline, no masses, no obvious thyroid enlargement.. No cervical or supraclavicular lymphadenopathy. Range of motion within normal limits.  Neuro: Cranial nerves 2-12 grossly intact.  No sensory deficit.  Respiratory: Unlabored respiratory effort, lungs clear to auscultation, no wheezes, no ronchi.  Cardiovascular: Normal S1, S2, no murmur, no edema.  MSK:  Rope like varicosities noted on internal right calf and left calf.  Right greater than left.  No tenderness " or areas of swelling noted.   Psych: Alert and oriented x3, normal affect and mood and judgement.        Assessment and Plan:   The following treatment plan was discussed    1. Dyslipidemia  Chronic, ongoing, labs WNL.  Refills today.  Encouraged weight loss. m onitor.  - pravastatin (PRAVACHOL) 40 MG tablet; TAKE ONE TABLET BY MOUTH DAILY IN THE EVENING  Dispense: 90 tablet; Refill: 3  - CBC WITH DIFFERENTIAL; Future  - Comp Metabolic Panel; Future  - Lipid Profile; Future    2. Vitamin D deficiency  Chronic, ongoing. Recheck levels.  Continue with supplements  - VITAMIN D,25 HYDROXY; Future      4. Screening for prostate cancer  Due for PSA screen.  No family history noted.  Order provided. Monitor and follow.   - PROSTATE SPECIFIC AG SCREENING; Future    5. Adult general medical exam  Here for preventative health exam.  Up to date on all measures.  Son was recently diagnosed with colon cancer.  Patient is up to date on colonoscopy.    Encouraged continuation of healthy diet, exercise and weight loss.    Discussion today about general wellness and lifestyle habits:   *engage in regular physical and social activities   *prevent falls and reduce trip hazards, using ambulatory aids, hearing and vision testing if appropriate   *skin care, including sunscreen    6. Obesity (BMI 30-39.9)  Chronic, ongoing.  BMI is up a bit  Encouraged weight loss/more exercise.  Monitor.     7. Varicose veins of leg with swelling, right  Chronic, ongoing, stable.  Discussed red flags.  Encouraged compression and elevation.       Followup: No follow-ups on file.         Educated in proper administration of medication(s) ordered today including safety, possible SE, risks, benefits, rationale and alternatives to therapy.     Please note that this dictation was created using voice recognition software. I have made every reasonable attempt to correct obvious errors, but I expect that there are errors of grammar and possibly content that I  did not discover before finalizing the note.

## 2021-06-02 ENCOUNTER — PATIENT MESSAGE (OUTPATIENT)
Dept: MEDICAL GROUP | Facility: PHYSICIAN GROUP | Age: 66
End: 2021-06-02

## 2021-10-21 ENCOUNTER — OFFICE VISIT (OUTPATIENT)
Dept: MEDICAL GROUP | Facility: PHYSICIAN GROUP | Age: 66
End: 2021-10-21
Payer: COMMERCIAL

## 2021-10-21 VITALS
OXYGEN SATURATION: 97 % | HEIGHT: 68 IN | BODY MASS INDEX: 31.22 KG/M2 | WEIGHT: 206 LBS | DIASTOLIC BLOOD PRESSURE: 94 MMHG | TEMPERATURE: 98.5 F | HEART RATE: 93 BPM | SYSTOLIC BLOOD PRESSURE: 170 MMHG

## 2021-10-21 DIAGNOSIS — J02.9 SORE THROAT: ICD-10-CM

## 2021-10-21 DIAGNOSIS — J02.0 STREP THROAT: ICD-10-CM

## 2021-10-21 DIAGNOSIS — H66.001 NON-RECURRENT ACUTE SUPPURATIVE OTITIS MEDIA OF RIGHT EAR WITHOUT SPONTANEOUS RUPTURE OF TYMPANIC MEMBRANE: ICD-10-CM

## 2021-10-21 LAB
INT CON NEG: NEGATIVE
INT CON POS: POSITIVE
S PYO AG THROAT QL: POSITIVE

## 2021-10-21 PROCEDURE — 99213 OFFICE O/P EST LOW 20 MIN: CPT | Performed by: NURSE PRACTITIONER

## 2021-10-21 PROCEDURE — 87880 STREP A ASSAY W/OPTIC: CPT | Performed by: NURSE PRACTITIONER

## 2021-10-21 RX ORDER — AMOXICILLIN AND CLAVULANATE POTASSIUM 875; 125 MG/1; MG/1
1 TABLET, FILM COATED ORAL 2 TIMES DAILY
Qty: 14 TABLET | Refills: 0 | Status: SHIPPED | OUTPATIENT
Start: 2021-10-21 | End: 2023-05-24

## 2021-10-21 NOTE — ASSESSMENT & PLAN NOTE
Presents with right ear pain.  Right TM bright red with bulging, intact drum.  Afebrile.  Also has positive strep.  Augmentin to pharmacy.

## 2021-10-21 NOTE — ASSESSMENT & PLAN NOTE
Reports two weeks of scratchy, sore throat.  No fever, chills.  Reports right sided ear pain.  + strep and + red TM.   + lymph nodes.

## 2021-10-21 NOTE — ASSESSMENT & PLAN NOTE
Positive strep test.  Will start augmentin.  Encouraged fluids, rest, ibuprofen.  Complete entire course.  Salt water gargles.

## 2021-10-21 NOTE — PROGRESS NOTES
"Chief Complaint   Patient presents with   • Sore Throat     2 weeks causing ear pain    • Hypertension     High readings today       Subjective:   Jd Bermudez is a 66 y.o. male here today for evaluation and management of:    Sore throat  Reports two weeks of scratchy, sore throat.  No fever, chills.  Reports right sided ear pain.  + strep and + red TM.   + lymph nodes.     Strep throat  Positive strep test.  Will start augmentin.  Encouraged fluids, rest, ibuprofen.  Complete entire course.  Salt water gargles.      Non-recurrent acute suppurative otitis media of right ear without spontaneous rupture of tympanic membrane  Presents with right ear pain.  Right TM bright red with bulging, intact drum.  Afebrile.  Also has positive strep.  Augmentin to pharmacy.          Current medicines (including changes today)  Current Outpatient Medications   Medication Sig Dispense Refill   • amoxicillin-clavulanate (AUGMENTIN) 875-125 MG Tab Take 1 Tablet by mouth 2 times a day. 14 Tablet 0   • pravastatin (PRAVACHOL) 40 MG tablet TAKE ONE TABLET BY MOUTH DAILY IN THE EVENING 90 tablet 3     No current facility-administered medications for this visit.     He  has a past medical history of Chronic back pain, Hyperlipidemia, and Vitamin D deficiency.    ROS as stated in hpi  No chest pain, no shortness of breath, no abdominal pain       Objective:     BP (!) 170/94 (BP Location: Left arm, Patient Position: Sitting, BP Cuff Size: Adult long)   Pulse 93   Temp 36.9 °C (98.5 °F) (Temporal)   Ht 1.727 m (5' 8\")   Wt 93.4 kg (206 lb)   SpO2 97%  Body mass index is 31.32 kg/m².   Physical Exam:  Constitutional: Alert, no distress.  Skin: Warm, dry, good turgor,no cyanosis, no rashes in visible areas.  Eye: Equal, round and reactive, conjunctiva clear, lids normal.  Ears: Right TM is red and bulging, intact.  Left TM with good light reflex.    Nose: symmetrical without tenderness, no discharge.  Mouth/Throat: lips without " lesion.  Oropharynx clear.  Throat with erythema and enlarged tonsils, no exudate.  + strep   Neck: Trachea midline, no masses, no obvious thyroid enlargement.. + anterior cervical lymph nodes, no supraclavicular lymphadenopathy. Range of motion within normal limits.  Neuro: Cranial nerves 2-12 grossly intact.  No sensory deficit.  Respiratory: Unlabored respiratory effort, lungs clear to auscultation, no wheezes, no ronchi.  Cardiovascular: Normal S1, S2, no murmur, no edema.  Abdomen: Soft, non-tender, no masses, no guarding,  no hepatosplenomegaly.  Psych: Alert and oriented x3, normal affect and mood and judgement.        Assessment and Plan:   The following treatment plan was discussed    1. Sore throat  See #2  - POCT Rapid Strep A    2. Strep throat  Acute strep throat.  Augmentin to pharmacy.  Fluids, rest, ibuprofen,, salt water gargles.  Complete entire course. Monitor and follow.     3. Non-recurrent acute suppurative otitis media of right ear without spontaneous rupture of tympanic membrane  Acute otitis media, right.  augmentin to pharmacy.  Complete entire course.  Ibuprofen for discomfort.  Monitor and follow.       Followup: No follow-ups on file.         Educated in proper administration of medication(s) ordered today including safety, possible SE, risks, benefits, rationale and alternatives to therapy.     Please note that this dictation was created using voice recognition software. I have made every reasonable attempt to correct obvious errors, but I expect that there are errors of grammar and possibly content that I did not discover before finalizing the note.

## 2021-10-26 ENCOUNTER — PATIENT MESSAGE (OUTPATIENT)
Dept: MEDICAL GROUP | Facility: PHYSICIAN GROUP | Age: 66
End: 2021-10-26

## 2021-10-26 RX ORDER — DOXYCYCLINE HYCLATE 100 MG
100 TABLET ORAL 2 TIMES DAILY
Qty: 20 TABLET | Refills: 0 | Status: SHIPPED | OUTPATIENT
Start: 2021-10-26 | End: 2023-05-24

## 2021-10-27 ENCOUNTER — PATIENT MESSAGE (OUTPATIENT)
Dept: MEDICAL GROUP | Facility: PHYSICIAN GROUP | Age: 66
End: 2021-10-27

## 2021-11-10 ENCOUNTER — PATIENT MESSAGE (OUTPATIENT)
Dept: MEDICAL GROUP | Facility: PHYSICIAN GROUP | Age: 66
End: 2021-11-10

## 2021-11-10 DIAGNOSIS — Z12.11 SCREEN FOR COLON CANCER: ICD-10-CM

## 2021-11-12 ENCOUNTER — PATIENT MESSAGE (OUTPATIENT)
Dept: MEDICAL GROUP | Facility: PHYSICIAN GROUP | Age: 66
End: 2021-11-12

## 2021-11-13 ENCOUNTER — PATIENT MESSAGE (OUTPATIENT)
Dept: MEDICAL GROUP | Facility: PHYSICIAN GROUP | Age: 66
End: 2021-11-13

## 2023-06-26 ENCOUNTER — OFFICE VISIT (OUTPATIENT)
Dept: URGENT CARE | Facility: PHYSICIAN GROUP | Age: 68
End: 2023-06-26
Payer: MEDICARE

## 2023-06-26 ENCOUNTER — HOSPITAL ENCOUNTER (OUTPATIENT)
Dept: RADIOLOGY | Facility: MEDICAL CENTER | Age: 68
End: 2023-06-26
Attending: FAMILY MEDICINE
Payer: MEDICARE

## 2023-06-26 VITALS
SYSTOLIC BLOOD PRESSURE: 132 MMHG | HEIGHT: 67 IN | WEIGHT: 210 LBS | TEMPERATURE: 98.7 F | BODY MASS INDEX: 32.96 KG/M2 | HEART RATE: 81 BPM | OXYGEN SATURATION: 95 % | DIASTOLIC BLOOD PRESSURE: 76 MMHG

## 2023-06-26 DIAGNOSIS — M79.671 RIGHT FOOT PAIN: ICD-10-CM

## 2023-06-26 PROCEDURE — 99213 OFFICE O/P EST LOW 20 MIN: CPT | Performed by: FAMILY MEDICINE

## 2023-06-26 PROCEDURE — 3078F DIAST BP <80 MM HG: CPT | Performed by: FAMILY MEDICINE

## 2023-06-26 PROCEDURE — 3075F SYST BP GE 130 - 139MM HG: CPT | Performed by: FAMILY MEDICINE

## 2023-06-26 PROCEDURE — 73630 X-RAY EXAM OF FOOT: CPT | Mod: RT

## 2023-06-26 RX ORDER — PANTOPRAZOLE SODIUM 40 MG/1
TABLET, DELAYED RELEASE ORAL
COMMUNITY
Start: 2023-06-21

## 2023-06-26 RX ORDER — COVID-19 MOLECULAR TEST ASSAY
KIT MISCELLANEOUS
COMMUNITY
Start: 2023-05-11

## 2023-06-26 NOTE — PROGRESS NOTES
"  Subjective:      68 y.o. male presents to urgent care for right foot pain that started on Saturday after he dropped a 20 pound pipe on his foot.  He was barefoot.  He has had constant pain since then, it is described as stinging, currently rated 8/10.  He has been using ibuprofen with some relief in symptoms.    He denies any other questions or concerns at this time.    Current problem list, medication, and past medical/surgical history were reviewed in Epic.    ROS  See HPI     Objective:      /76   Pulse 81   Temp 37.1 °C (98.7 °F) (Temporal)   Ht 1.702 m (5' 7\")   Wt 95.3 kg (210 lb)   SpO2 95%   BMI 32.89 kg/m²     Physical Exam  Constitutional:       General: He is not in acute distress.     Appearance: He is not diaphoretic.   Cardiovascular:      Rate and Rhythm: Normal rate and regular rhythm.      Heart sounds: Normal heart sounds.   Pulmonary:      Effort: Pulmonary effort is normal. No respiratory distress.      Breath sounds: Normal breath sounds.   Musculoskeletal:      Comments: Edema and bruising noted to his right foot.  This is tender to palpation.  Antalgic gait.   Neurological:      Mental Status: He is alert.   Psychiatric:         Mood and Affect: Affect normal.         Judgment: Judgment normal.       Assessment/Plan:     1. Right foot pain  XRAY showing no acute osseous abnormality.  Most consistent with contusion.  He was placed in a boot for comfort and to help with swelling.  Tylenol and ibuprofen as needed.  - DX-FOOT-COMPLETE 3+ RIGHT; Future      Instructed to return to Urgent Care or nearest Emergency Department if symptoms fail to improve, for any change in condition, further concerns, or new concerning symptoms. Patient states understanding of the plan of care and discharge instructions.    Sylvia De La Fuente M.D.   "